# Patient Record
(demographics unavailable — no encounter records)

---

## 2025-05-19 NOTE — HISTORY OF PRESENT ILLNESS
[de-identified] : Patient is a 35-year-old male here for evaluation of left middle finger injury.  Patient states on 5/17/2025 he was playing basketball.  Patient states that his left middle finger got caught on another player's jersey.  Patient started feeling pain immediately.  Patient went to urgent care in [place], x-rays were taken patient was told he had a distal phalanx fracture.  Patient was told to follow with orthopedics for further evaluation treatment denies numbness tingling  Left middle finger exam: Noted ecchymosis distal phalanx with no erythema no sign of infection no nailbed injury.  Tenderness palpation of the distal phalanx, good range of motion at MCPJ PIPJ and DIPJ with good strength throughout no gross instability with radial/ulnar stress neurovascular intact good capillary refill  X-ray left middle finger 3 views: Acute mildly displaced intra-articular fracture of the distal phalanx  Discussed with patient detail that he has a distal phalanx fracture.  Discussed that these fractures generally heal well with conservative treatment.  Discussed risks with patient that there is a possibility that the fracture can move and require surgical intervention in the future.  Plan is for conservative treatment at this time.  Placed patient in AlumaFoam finger splint from the middle phalanx to the distal phalanx to wear at all times cover for showers can take off for hygiene only.  Patient works a labor-intensive job, advised patient will hold off from work until follow-up in 2 to 3 weeks with Dr. Browning.  No heavy lifting, pushing, pulling.  Naproxen 500 mg twice daily sent patient's pharmacy.  NSAID should be taken with food. In addition while taking the prescribed NSAID, no over the counter or other NSAIDs should be used, such as ibuprofen (Motrin or Advil) or naproxen (Aleve) as this can cause stomach upset or other side effects such as hypertension, gastritis, stomach bleed/ulcer, allergic reaction. If needed for breakthrough pain Tylenol can be used.  Patient understands agrees with plan

## 2025-06-03 NOTE — DATA REVIEWED
[FreeTextEntry1] : Radiographs 3 views of his left little finger reviewed showing a well aligned healing distal phalanx fracture

## 2025-06-03 NOTE — HISTORY OF PRESENT ILLNESS
[de-identified] : 35-year-old male had injury to his left middle finger.  Comes in today for evaluation.  He is wearing Coban on the finger.  Does not really wear a brace all the time.  He is working normal duty as a  lieutenant

## 2025-06-03 NOTE — ASSESSMENT
[FreeTextEntry1] : Patient a left middle finger distal phalanx fracture.  Fracture alignment is acceptable.  He is moving well.  Full range of motion with no pain.  We discussed no sports for the next couple of weeks.  He is continuing to work normal duty.  He will see me back on an as-needed basis.

## 2025-06-03 NOTE — PHYSICAL EXAM
[de-identified] : Patient has full range of motion of the finger.  There is no erythema ecchymoses or abrasion.  There is no swelling.  Normal capillary refill.  More tenderness ulnarly than radially on the digit at the distal phalanx nail plate is intact